# Patient Record
Sex: FEMALE | Race: WHITE
[De-identification: names, ages, dates, MRNs, and addresses within clinical notes are randomized per-mention and may not be internally consistent; named-entity substitution may affect disease eponyms.]

---

## 2019-01-30 ENCOUNTER — HOSPITAL ENCOUNTER (OUTPATIENT)
Dept: HOSPITAL 46 - DS | Age: 54
Discharge: HOME | End: 2019-01-30
Attending: OBSTETRICS & GYNECOLOGY
Payer: COMMERCIAL

## 2019-01-30 VITALS — HEIGHT: 56 IN | BODY MASS INDEX: 29.25 KG/M2 | WEIGHT: 130.01 LBS

## 2019-01-30 DIAGNOSIS — N84.0: Primary | ICD-10-CM

## 2019-01-30 DIAGNOSIS — J45.909: ICD-10-CM

## 2019-01-30 DIAGNOSIS — E66.9: ICD-10-CM

## 2019-01-30 DIAGNOSIS — F41.9: ICD-10-CM

## 2019-01-30 DIAGNOSIS — N95.2: ICD-10-CM

## 2019-01-30 PROCEDURE — 0UB97ZZ EXCISION OF UTERUS, VIA NATURAL OR ARTIFICIAL OPENING: ICD-10-PCS | Performed by: OBSTETRICS & GYNECOLOGY

## 2019-01-30 PROCEDURE — 0UDB7ZZ EXTRACTION OF ENDOMETRIUM, VIA NATURAL OR ARTIFICIAL OPENING: ICD-10-PCS | Performed by: OBSTETRICS & GYNECOLOGY

## 2019-01-30 PROCEDURE — 0UJD8ZZ INSPECTION OF UTERUS AND CERVIX, VIA NATURAL OR ARTIFICIAL OPENING ENDOSCOPIC: ICD-10-PCS | Performed by: OBSTETRICS & GYNECOLOGY

## 2019-01-30 NOTE — NUR
PT IS BACK TO  FROM PACU. SHE IS SLEEPY UPON ARRIVAL, BUT EASY TO AROUSE.
SHE IS DENYING PAIN AND NAUSEA. CALL LIGHT IS WITHIN REACH. WATER ON BEDSIDE
TABLE. NO ADDITIONAL NEEDS AT THIS TIME. WILL CONTINUE TO MONITOR.

## 2019-01-30 NOTE — NUR
MIAH 0915: PT IS ASSISTED UP OOB TO THE BATHROOM. SHE IS ABLE TO VOID
APPROXIMATELY 175ML'S OF YELLOW URINE. PT THREW HER TOLIET PAPER INTO THE
HAT AND URINATED ON HER PERIPAD, WHICH SHE THREW IN THE GARBAGE. IT IS HARD TO
DETERMINE THE AMOUNT OF DRAINAGE WAS/IS ON THE PERIPAD DUE TO BEING SATURATED
IN URINE. SHE IS GIVEN A FRESH PERIPAD AND HELPED BACK TO HER ROOM. SHE
REQUESTS SALTINE CRACKERS.

## 2019-01-30 NOTE — NUR
MIAH 0950: PT HAS MET DC INSTRUCTIONS. SHE INDICATES THAT SHE WOULD LIKE TO GO
HOME. DC INSTRUCTIONS ARE GIVEN VERBALLY WITH THE  PRESENT. THEY
VERBALIZE UNDERSTANDING AND ASK QUESTIONS. PT IS TAKEN TO VEHICLE BY VOLUNTEER
IN A WHEELCHAIR.

## 2019-02-27 NOTE — OR
[facility]
                                    [address] [place], [place]  [address]
_________________________________________________________________________________________
                                                                 Signed   
 
 
DATE OF OPERATION:
2019
 
SURGEON:
Lesly Rajput MD
 
PREOPERATIVE DIAGNOSIS:
Postmenopausal bleeding, probable endometrial polyp.
 
POSTOPERATIVE DIAGNOSIS:
Postmenopausal bleeding, probable endometrial polyp, pending pathology.
 
PROCEDURE:
Hysteroscopy, D and C.
 
ANESTHESIA:
General MAC.
 
ESTIMATED BLOOD LOSS:
Minimal.
 
DRAINS:
None.
 
INDICATIONS AND FINDINGS:
The patient is a 53-year-old female,  2, para 1, SAB 1, who has been having
abnormal postmenopausal bleeding.  Hysterosonogram was done, which revealed a
probable polyp.  She is on a hormone replacement therapy with a combination of estrogen
and progesterone.  At the time of surgery, exam under anesthesia revealed a normal-size
uterus.  The cavity sounded 7 cm.  The great majority of the cavity was atrophic with a
single small polyp. 
 
PROCEDURE:
The patient was prepped and draped in the dorsal lithotomy position.  A weighted
speculum was placed.  The anterior lip of the cervix was visualized and grasped with a
single-tooth tenaculum.  The cavity sounded to 7 cm.  The endocervical canal was then
dilated with some difficulty to a #8 dilator.  The MyoSure device was introduced and the
cavity evaluated.  It primarily was atrophic with a small polyp in the lower segment.
The hysteroscope was removed and D and C was done with a small tissue found.  The cervix
was then evaluated after removal of the tenaculum and there was some bleeding from that
site, which did not respond to pressure.  A figure-of-eight x2 was placed with good
hemostasis 
 
    Electronically Signed By: LESLY RAJPUT MD  19 0920
_________________________________________________________________________________________
PATIENT NAME:     JAMES MURGUIA                   
MEDICAL RECORD #: [id number]            OPERATIVE REPORT              
          ACCT #: [id number]  
DATE OF BIRTH:   [birth year]            REPORT #: [id number]      
PHYSICIAN:        LESLY RAJPUT MD            
PCP:              MAMIE HERNÁNDEZ MD      
REPORT IS CONFIDENTIAL AND NOT TO BE RELEASED WITHOUT AUTHORIZATION
 
 
                                  [facility]
                                    [address], [place]  [address]
_________________________________________________________________________________________
                                                                 Signed   
 
 
noted.  The remaining instruments removed.  The patient was taken to the recovery room
in good condition.  All sponge and needle counts were correct. 
 
 
 
            ________________________________________
            Lesly Rajput MD 
 
 
PJW/MODL
Job #:  [id number]
DD:  2019 08:24:28
DT:  2019 14:33:59
 
cc:      Dr. Mamie Hernández
 
 
Copies:                                
~
 
 
 
 
 
 
 
 
 
 
 
 
 
 
 
 
 
 
 
 
 
 
 
 
    Electronically Signed By: LESLY RAJPUT MD  19 0920
_________________________________________________________________________________________
PATIENT NAME:     JAMES MURGUIA                   
MEDICAL RECORD #: [id number]            OPERATIVE REPORT              
          ACCT #: [id number]  
DATE OF BIRTH:   [birth year]            REPORT #: [id number]      
PHYSICIAN:        LESLY RAJPUT MD            
PCP:              MAMIE HERNÁNDEZ MD      
REPORT IS CONFIDENTIAL AND NOT TO BE RELEASED WITHOUT AUTHORIZATION

## 2021-01-11 NOTE — NUR
01/11/21 0848 Denae Jackson 0807 PT ARRIVED IN PACU SLEEPY LAYING ON L SIDE. ABD SOFT AND
PASSING FLATUS. 0820 RESTING. REU. 0830 SITTING UP IN BED SIPPING ON
WATER. 0845 GETTING DRESSED WITH STAND BY ASSIST. DC INSTRUCTIONS
GIVEN. ALL QUESTIONS ANSWERED.
